# Patient Record
Sex: MALE | Race: WHITE | NOT HISPANIC OR LATINO | Employment: OTHER | URBAN - METROPOLITAN AREA
[De-identification: names, ages, dates, MRNs, and addresses within clinical notes are randomized per-mention and may not be internally consistent; named-entity substitution may affect disease eponyms.]

---

## 2017-10-31 ENCOUNTER — ALLSCRIPTS OFFICE VISIT (OUTPATIENT)
Dept: OTHER | Facility: OTHER | Age: 72
End: 2017-10-31

## 2017-11-01 NOTE — PROGRESS NOTES
Assessment  1  Benign localized prostatic hyperplasia with lower urinary tract symptoms (LUTS)   (600 21,599 69) (N40 1)    Plan  Benign localized prostatic hyperplasia with lower urinary tract symptoms (LUTS)    · Follow-up visit in 1 year Evaluation and Treatment  Follow-up  Status: Hold For -  Scheduling  Requested for: 31Oct2017   Ordered; For: Benign localized prostatic hyperplasia with lower urinary tract symptoms (LUTS); Ordered By: Marisela Pascal Performed:  Due: [id number]   · (1) PSA, DIAGNOSTIC (FOLLOW-UP); Status:Active; Requested for:31Oct2018; Perform:[facility]; RCL:[id number];[id number]; For:Benign localized prostatic hyperplasia with lower urinary tract symptoms (LUTS); Ordered By:Mayer, Lucile Boas;    Discussion/Summary  Discussion Summary:   He denies urinary complaints, prostate exam is normal  Patient will return in one year with PSA prior to next visit  Goals and Barriers: The patient has the current Goals: Patient will return in one year with PSA prior to next visit  The patent has the current Barriers: None  Patient's Capacity to Self-Care: Patient is able to Self-Care  Understands and agrees with treatment plan: The treatment plan was reviewed with the patient/guardian  The patient/guardian understands and agrees with the treatment plan      Chief Complaint  Chief Complaint Free Text Note Form: BPH= 2 8 (10/11/2017)      History of Present Illness  HPI: Since seen in follow up for history of BPH and elevated PSA  PSA trend has gone from 3 6, 3 2, 2 7 to 2 8 currently  He denies any urinary complaints  No history of hematuria urinary tract infections  Review of Systems  Complete-Male Urology:   Constitutional: No fever or chills, feels well, no tiredness, no recent weight gain or weight loss  Respiratory: No complaints of shortness of breath, no wheezing, no cough, no SOB on exertion, no orthopnea or PND     Cardiovascular: No complaints of slow heart rate, no fast heart rate, no chest pain, no palpitations, no leg claudication, no lower extremity  Gastrointestinal: No complaints of abdominal pain, no constipation, no nausea or vomiting, no diarrhea or bloody stools  Genitourinary: Empty sensation-- and-- stream quality fair, but-- as noted in HPI,-- no dysuria,-- no urinary hesitancy,-- no hematuria,-- no incontinence,-- no nocturia-- and-- no feelings of urinary urgency  Musculoskeletal: No complaints of arthralgia, no myalgias, no joint swelling or stiffness, no limb pain or swelling  Integumentary: No complaints of skin rash or skin lesions, no itching, no skin wound, no dry skin  Hematologic/Lymphatic: No complaints of swollen glands, no swollen glands in the neck, does not bleed easily, no easy bruising  Neurological: No compliants of headache, no confusion, no convulsions, no numbness or tingling, no dizziness or fainting, no limb weakness, no difficulty walking  Active Problems  1  Benign localized prostatic hyperplasia with lower urinary tract symptoms (LUTS)   (600 21,599 69) (N40 1)    Past Medical History  Active Problems And Past Medical History Reviewed: The active problems and past medical history were reviewed and updated today  Surgical History  Surgical History Reviewed: The surgical history was reviewed and updated today  Family History  Family History Reviewed: The family history was reviewed and updated today  Social History   · Never A Smoker  Social History Reviewed: The social history was reviewed and updated today  Current Meds   1  Adult Aspirin EC Low Strength 81 MG Oral Tablet Delayed Release Recorded   2  Losartan Potassium 25 MG Oral Tablet; Therapy: [id number] to (Last Rx:[id number])  Requested for: [id number] Ordered  Medication List Reviewed: The medication list was reviewed and updated today  Allergies  1   No Known Drug Allergies    Vitals  Vital Signs    Recorded: [id number] 07:37AM   Heart Rate 60 Systolic 224   Diastolic 80   Height 5 ft 11 in   Weight 186 lb    BMI Calculated 25 94   BSA Calculated 2 04     Physical Exam    Constitutional   General appearance: No acute distress, well appearing and well nourished  Pulmonary   Respiratory effort: No increased work of breathing or signs of respiratory distress  Cardiovascular   Auscultation of heart: Normal rate and rhythm, normal S1 and S2, without murmurs  Examination of extremities for edema and/or varicosities: Normal     Abdomen   Abdomen: Non-tender, no masses  Genitourinary   Anus and perineum: Normal     Scrotum contents: Normal size, no masses  Epididymis: Normal, no masses  Urethral meatus: Normal, no lesions  Penis: Normal, no lesions  Digital rectal exam of prostate: Normal size, no masses  -- 35 gram  No nodules  Musculoskeletal   Gait and station: Normal     Digits and nails: Normal without clubbing or cyanosis  Inspection/palpation of joints, bones, and muscles: Normal     Skin   Skin and subcutaneous tissue: Normal without rashes or lesions      Lymphatic   Palpation of lymph nodes in groin: Normal        Signatures   Electronically signed by : RAIN Eldridge ; Oct 31 2017  7:43AM EST                       (Author)

## 2018-01-14 VITALS
SYSTOLIC BLOOD PRESSURE: 130 MMHG | BODY MASS INDEX: 26.04 KG/M2 | DIASTOLIC BLOOD PRESSURE: 80 MMHG | HEIGHT: 71 IN | WEIGHT: 186 LBS | HEART RATE: 60 BPM

## 2018-06-04 ENCOUNTER — TELEPHONE (OUTPATIENT)
Dept: UROLOGY | Facility: HOSPITAL | Age: 73
End: 2018-06-04

## 2018-06-04 NOTE — TELEPHONE ENCOUNTER
Pt come In and signed medical record release   Records faxed to Dr Hendricks Paget at [phone] per pts request

## 2018-06-08 NOTE — PROGRESS NOTES
6/11/2018    Declan De La Cruz  1945  [id number]    Discussion and Plan    Imaging studies are reviewed which are noteworthy for right 8-9 cm right renal cyst with calcification  No areas of enhancement noted  I discussed that this is considered to be a mildly complex cyst for which periodic surveillance is warranted though no features are present currently suggestive of malignancy  Postvoid residual is nominal at 15 cc  He therefore will follow-up as scheduled later this year for PSA check  Renal bladder ultrasound should be repeated in 1 year  All questions answered at this time  Assessment      Patient Active Problem List   Diagnosis    Renal cyst    Benign prostatic hyperplasia       History of Present Illness    Geronimo Olivas is a 74 y o  male seen today in regards to a history of BPH and elevated PSA  PSA trend has gone from 3 6, 3 2, 2 7 to 2 8 currently  He denies any urinary complaints  No history of hematuria urinary tract infections  He is seen today in regards to an incidental discovery of a approximately 9 cm right renal cyst   This is noted on a CT scan for right lower quadrant discomfort following hip surgery  Patient denies any hematuria or overt flank pain  Review of the images demonstrates a simple right renal cyst with a area of calcification  The walls are otherwise normal with no regions of enhancement seen      Urinary Symptom Assessment        Past Medical History  Past Medical History:   Diagnosis Date    BPH (benign prostatic hyperplasia)        Past Social History  Past Surgical History:   Procedure Laterality Date    APPENDECTOMY      REVISION TOTAL HIP ARTHROPLASTY      TOTAL HIP ARTHROPLASTY         Past Family History  Family History   Problem Relation Age of Onset    No Known Problems Father     Stroke Mother        Past Social history  Social History     Social History    Marital status: /Civil Union     Spouse name: N/A    Number of children: N/A    Years of education: N/A     Occupational History    Not on file  Social History Main Topics    Smoking status: Former Smoker    Smokeless tobacco: Never Used      Comment: pipe - quit inthe 70"s    Alcohol use 0 6 oz/week     1 Glasses of wine per week    Drug use: No    Sexual activity: Not on file     Other Topics Concern    Not on file     Social History Narrative    No narrative on file       Current Medications  Current Outpatient Prescriptions   Medication Sig Dispense Refill    aspirin (ADULT ASPIRIN EC LOW STRENGTH) 81 mg EC tablet Take by mouth      losartan (COZAAR) 25 mg tablet TAKE 1 TABLET BY MOUTH EVERY DAY      multivitamin (THERAGRAN) TABS Take 1 tablet by mouth      sildenafil (VIAGRA) 100 mg tablet Viagra 100 mg tablet       No current facility-administered medications for this visit  Allergies  Allergies   Allergen Reactions    Celecoxib Dizziness       Past Medical History, Social History, Family History, medications and allergies were reviewed  Review of Systems  Review of Systems   Constitutional: Negative  HENT: Negative  Eyes: Negative  Respiratory: Negative  Cardiovascular: Negative  Gastrointestinal: Negative  Endocrine: Negative  Genitourinary: Negative for decreased urine volume, difficulty urinating and hematuria  Musculoskeletal: Negative  Skin: Negative  Neurological: Negative  Hematological: Negative  Psychiatric/Behavioral: Negative  Vitals  Vitals:    06/11/18 1352   BP: 142/86   Pulse: 90   Weight: 84 7 kg (186 lb 12 8 oz)   Height: 5' 11" (1 803 m)         Physical Exam    Physical Exam   Constitutional: He is oriented to person, place, and time  He appears well-developed and well-nourished  HENT:   Head: Normocephalic and atraumatic  Eyes: Pupils are equal, round, and reactive to light  Neck: Normal range of motion  Cardiovascular: Normal rate, regular rhythm and normal heart sounds      Pulmonary/Chest: Effort normal and breath sounds normal  No accessory muscle usage  No respiratory distress  Abdominal: Soft  Normal appearance and bowel sounds are normal  There is no tenderness  Musculoskeletal: Normal range of motion  Neurological: He is alert and oriented to person, place, and time  Skin: Skin is warm, dry and intact  Psychiatric: He has a normal mood and affect  His speech is normal  Cognition and memory are normal    Nursing note and vitals reviewed  Results    Below listed labs, pathology results, and radiology images were personally reviewed:    No results found for: PSA  No results found for: GLUCOSE, CALCIUM, NA, K, CO2, CL, BUN, CREATININE  No results found for: WBC, HGB, HCT, MCV, PLT    Recent Results (from the past 1 hour(s))   POCT Measure PVR    Collection Time: 06/11/18  1:59 PM   Result Value Ref Range    POST-VOID RESIDUAL VOLUME, ML POC 15 mL   ]      Result Impression   Impression:  1  No acute abnormality within the abdomen or pelvis  2  Mildly complex right upper pole renal cyst as described  3  Colonic diverticulosis without acute diverticulitis  4  Other findings as above  Workstation:[email or web address]   Result Narrative   Clinical History: Right lower quadrant abdominal pain  Comparison: No previous abdominal CT  Comment: CT of the abdomen and pelvis was performed after the administration of  90 cc of Omnipaque 350 intravenous contrast  450 cc of barium sulfate oral  contrast was administered prior to the study as well  Abdomen:  Lower chest: Minimal dependent atelectatic changes at both lung bases  Liver: Unremarkable  Spleen: Unremarkable  Pancreas: Unremarkable  Adrenal glands: Unremarkable  Gallbladder/bile ducts: Unremarkable  Kidneys: Mildly complex exophytic right upper pole renal cyst with minimal  peripheral wall calcification posteriorly, measures up to 8 9 cm in diameter  No  hydronephrosis  Bowel: Small and large bowel normal in caliber  Colonic diverticulosis, without  acute diverticulitis  Status post appendectomy  Lymph nodes: No retroperitoneal or mesenteric adenopathy  Peritoneum: No free fluid, free intraperitoneal air, or fluid collection within  the abdomen  Vessels: Abdominal aorta normal in caliber with mild atherosclerotic changes  Pelvis:  Streak artifact from patient's bilateral hip prosthesis limits evaluation of  portions of the lower pelvis  Urinary bladder: Well-visualized given the above described limitation  Lymph nodes: No pelvic adenopathy given streak artifact  Bones: Status post bilateral total hip arthroplasty

## 2018-06-11 ENCOUNTER — OFFICE VISIT (OUTPATIENT)
Dept: UROLOGY | Facility: AMBULATORY SURGERY CENTER | Age: 73
End: 2018-06-11
Payer: COMMERCIAL

## 2018-06-11 VITALS
SYSTOLIC BLOOD PRESSURE: 142 MMHG | HEART RATE: 90 BPM | HEIGHT: 71 IN | BODY MASS INDEX: 26.15 KG/M2 | WEIGHT: 186.8 LBS | DIASTOLIC BLOOD PRESSURE: 86 MMHG

## 2018-06-11 DIAGNOSIS — N28.1 RENAL CYST: ICD-10-CM

## 2018-06-11 DIAGNOSIS — N40.0 BENIGN PROSTATIC HYPERPLASIA, UNSPECIFIED WHETHER LOWER URINARY TRACT SYMPTOMS PRESENT: Primary | ICD-10-CM

## 2018-06-11 LAB — POST-VOID RESIDUAL VOLUME, ML POC: 15 ML

## 2018-06-11 PROCEDURE — 99214 OFFICE O/P EST MOD 30 MIN: CPT | Performed by: UROLOGY

## 2018-06-11 PROCEDURE — 51798 US URINE CAPACITY MEASURE: CPT | Performed by: UROLOGY

## 2018-06-11 RX ORDER — LOSARTAN POTASSIUM 50 MG/1
TABLET ORAL
COMMUNITY
Start: 2018-01-29 | End: 2020-07-22 | Stop reason: ALTCHOICE

## 2018-06-11 RX ORDER — ASPIRIN 81 MG/1
TABLET ORAL
COMMUNITY

## 2018-06-11 RX ORDER — DIPHENOXYLATE HYDROCHLORIDE AND ATROPINE SULFATE 2.5; .025 MG/1; MG/1
1 TABLET ORAL
COMMUNITY

## 2018-06-11 RX ORDER — SILDENAFIL 100 MG/1
TABLET, FILM COATED ORAL
COMMUNITY

## 2018-10-31 DIAGNOSIS — N40.1 ENLARGED PROSTATE WITH LOWER URINARY TRACT SYMPTOMS (LUTS): ICD-10-CM

## 2018-10-31 NOTE — PROGRESS NOTES
11/1/2018    Ami Mendota  1945  [id number]    Discussion and Plan    Patient continues to do well with no significant changes in urinary pattern  PSA trend stable  He will return in 1 year with PSA and repeat renal ultrasound prior to visit  All questions answered at this time  1  Benign prostatic hyperplasia with lower urinary tract symptoms, symptom details unspecified  - PSA Total, Diagnostic; Future    2  Renal cyst  - US kidney and bladder with pvr; Future    Assessment      Patient Active Problem List   Diagnosis    Renal cyst    Benign prostatic hyperplasia       History of Present Illness    Juanita Cabral is a 74 y o  male seen today in regards to a history of BPH and elevated PSA  PSA trend has gone from 3 6, 3 2, 2 7 to 2 8 and 2 7 currently  He denies any urinary complaints  No history of hematuria urinary tract infections  He is seen today in regards to an incidental discovery of a approximately 9 cm right renal cyst   This is noted on a CT scan for right lower quadrant discomfort following hip surgery  Patient denies any hematuria or overt flank pain  Review of the images demonstrates a simple right renal cyst with a area of calcification  The walls are otherwise normal with no regions of enhancement seen  Urinary Symptom Assessment        Past Medical History  Past Medical History:   Diagnosis Date    BPH (benign prostatic hyperplasia)     Hypertension        Past Social History  Past Surgical History:   Procedure Laterality Date    APPENDECTOMY      REVISION TOTAL HIP ARTHROPLASTY      TOTAL HIP ARTHROPLASTY         Past Family History  Family History   Problem Relation Age of Onset    No Known Problems Father     Stroke Mother        Past Social history  Social History     Social History    Marital status: /Civil Union     Spouse name: N/A    Number of children: N/A    Years of education: N/A     Occupational History    Not on file       Social History Main Topics  Smoking status: Former Smoker    Smokeless tobacco: Never Used      Comment: pipe - quit inthe 70"s    Alcohol use 0 6 oz/week     1 Glasses of wine per week    Drug use: No    Sexual activity: Not on file     Other Topics Concern    Not on file     Social History Narrative    No narrative on file       Current Medications  Current Outpatient Prescriptions   Medication Sig Dispense Refill    aspirin (ADULT ASPIRIN EC LOW STRENGTH) 81 mg EC tablet Take by mouth      losartan (COZAAR) 50 mg tablet TAKE 1 TABLET BY MOUTH EVERY DAY      multivitamin (THERAGRAN) TABS Take 1 tablet by mouth      sildenafil (VIAGRA) 100 mg tablet Viagra 100 mg tablet       No current facility-administered medications for this visit  Allergies  Allergies   Allergen Reactions    Celecoxib Dizziness       Past Medical History, Social History, Family History, medications and allergies were reviewed  Review of Systems  Review of Systems   Genitourinary: Negative for decreased urine volume, difficulty urinating and hematuria  All other systems reviewed and are negative  Vitals  Vitals:    11/01/18 1027   BP: 142/82   BP Location: Left arm   Patient Position: Sitting   Cuff Size: Standard   Pulse: 80   Weight: 81 6 kg (180 lb)   Height: 5' 10 5" (1 791 m)         Physical Exam    Physical Exam   Constitutional: He is oriented to person, place, and time  He appears well-developed and well-nourished  HENT:   Head: Normocephalic and atraumatic  Eyes: Pupils are equal, round, and reactive to light  Neck: Normal range of motion  Cardiovascular: Normal rate, regular rhythm and normal heart sounds  Pulmonary/Chest: Effort normal and breath sounds normal  No accessory muscle usage  No respiratory distress  Abdominal: Soft  Normal appearance and bowel sounds are normal  There is no tenderness  Genitourinary: Rectum normal, prostate normal and penis normal  No penile tenderness     Genitourinary Comments: Prostate approximately 40 g  No nodules  Musculoskeletal: Normal range of motion  Neurological: He is alert and oriented to person, place, and time  Skin: Skin is warm, dry and intact  Psychiatric: He has a normal mood and affect  His speech is normal  Cognition and memory are normal    Nursing note and vitals reviewed        Results    Below listed labs, pathology results, and radiology images were personally reviewed:    No results found for: PSA  No results found for: GLUCOSE, CALCIUM, NA, K, CO2, CL, BUN, CREATININE  No results found for: WBC, HGB, HCT, MCV, PLT    No results found for this or any previous visit (from the past 1 hour(s)) ]

## 2018-11-01 ENCOUNTER — OFFICE VISIT (OUTPATIENT)
Dept: UROLOGY | Facility: AMBULATORY SURGERY CENTER | Age: 73
End: 2018-11-01
Payer: COMMERCIAL

## 2018-11-01 VITALS
WEIGHT: 180 LBS | HEART RATE: 80 BPM | BODY MASS INDEX: 25.2 KG/M2 | DIASTOLIC BLOOD PRESSURE: 82 MMHG | HEIGHT: 71 IN | SYSTOLIC BLOOD PRESSURE: 142 MMHG

## 2018-11-01 DIAGNOSIS — N40.1 BENIGN PROSTATIC HYPERPLASIA WITH LOWER URINARY TRACT SYMPTOMS, SYMPTOM DETAILS UNSPECIFIED: Primary | ICD-10-CM

## 2018-11-01 DIAGNOSIS — N28.1 RENAL CYST: ICD-10-CM

## 2018-11-01 PROCEDURE — 99214 OFFICE O/P EST MOD 30 MIN: CPT | Performed by: UROLOGY

## 2019-10-16 ENCOUNTER — TELEPHONE (OUTPATIENT)
Dept: UROLOGY | Facility: AMBULATORY SURGERY CENTER | Age: 74
End: 2019-10-16

## 2019-10-16 NOTE — TELEPHONE ENCOUNTER
patient stated that he was on his way to the doctor , and that he will call us back later to reschedule the appt due to him not knowing when he can come in     NL

## 2019-11-01 ENCOUNTER — HOSPITAL ENCOUNTER (OUTPATIENT)
Dept: RADIOLOGY | Age: 74
Discharge: HOME | End: 2019-11-01
Payer: MEDICARE

## 2019-11-01 DIAGNOSIS — N28.1 RENAL CYST: ICD-10-CM

## 2019-11-01 PROCEDURE — 51798 US URINE CAPACITY MEASURE: CPT

## 2019-11-11 ENCOUNTER — TELEPHONE (OUTPATIENT)
Dept: UROLOGY | Facility: AMBULATORY SURGERY CENTER | Age: 74
End: 2019-11-11

## 2019-11-11 NOTE — TELEPHONE ENCOUNTER
Patient has upcoming appointment for Gauri Pineda on 12/02/19 and stated he needs an updated psa lab order mailed to his home address    He goes to [address] [place] [address]

## 2019-11-20 NOTE — TELEPHONE ENCOUNTER
Call placed to patient, apologized for the delay  Offered to email to patient   Order emailed to him at [email or web address]

## 2019-11-20 NOTE — TELEPHONE ENCOUNTER
Patient left message on the medication refill line saying that he still has not received his script for the PSA  Patient is worried he wont have it in time for his appointment

## 2020-01-06 ENCOUNTER — OFFICE VISIT (OUTPATIENT)
Dept: UROLOGY | Facility: AMBULATORY SURGERY CENTER | Age: 75
End: 2020-01-06
Payer: MEDICARE

## 2020-01-06 VITALS
SYSTOLIC BLOOD PRESSURE: 120 MMHG | WEIGHT: 183 LBS | HEIGHT: 70 IN | HEART RATE: 80 BPM | BODY MASS INDEX: 26.2 KG/M2 | DIASTOLIC BLOOD PRESSURE: 72 MMHG

## 2020-01-06 DIAGNOSIS — R97.20 ELEVATED PSA: ICD-10-CM

## 2020-01-06 DIAGNOSIS — N28.1 RENAL CYST: Primary | ICD-10-CM

## 2020-01-06 PROCEDURE — 99214 OFFICE O/P EST MOD 30 MIN: CPT | Performed by: UROLOGY

## 2020-01-06 NOTE — PROGRESS NOTES
1/6/2020    Alfredo Frank  1945  [id number]        Assessment  Large right renal cyst  Elevated PSA    Plan  We reviewed his recent ultrasound indicating a stable 9 cm mildly complex right renal cyst   We discussed his concerned about this  Will check another ultrasound in 1 year to ensure stability, and then likely will discontinue imaging  Regarding elevated PSA, we discussed AUA guidelines and usual discontinuation of PSA screening  We also discussed potential reasons for significant PSA rise  I would repeat his PSA and check fractionated PSA as well  He will abstain from sexual activity for 72 hours prior  History of Present Illness  Zan Vergara is a 66 y o  male former patient of Dr Vijay Rosales with history of BPH and large right renal cyst   He has had PSA stable within the 2 to 3 range, but this past year had PSA 4 95  He is unsure if he was sexually active around that time  He denies any urinary complaints despite enlarged prostate  AUA symptom score is 6  Review of Systems  Review of Systems   Constitutional: Negative  HENT: Negative  Respiratory: Negative  Cardiovascular: Negative  Gastrointestinal: Negative  Genitourinary:        As per HPI   Musculoskeletal: Negative  Skin: Negative  Neurological: Negative  Hematological: Negative            Past Medical History  Past Medical History:   Diagnosis Date    BPH (benign prostatic hyperplasia)     Hypertension        Past Social History  Past Surgical History:   Procedure Laterality Date    APPENDECTOMY      REVISION TOTAL HIP ARTHROPLASTY      TOTAL HIP ARTHROPLASTY         Past Family History  Family History   Problem Relation Age of Onset    No Known Problems Father     Stroke Mother        Past Social history  Social History     Socioeconomic History    Marital status: /Civil Union     Spouse name: Not on file    Number of children: Not on file    Years of education: Not on file   Rona Jones Highest education level: Not on file   Occupational History    Not on file   Social Needs    Financial resource strain: Not on file    Food insecurity:     Worry: Not on file     Inability: Not on file    Transportation needs:     Medical: Not on file     Non-medical: Not on file   Tobacco Use    Smoking status: Former Smoker    Smokeless tobacco: Never Used    Tobacco comment: pipe - quit inthe 70"s   Substance and Sexual Activity    Alcohol use: Yes     Alcohol/week: 1 0 standard drinks     Types: 1 Glasses of wine per week    Drug use: No    Sexual activity: Not on file   Lifestyle    Physical activity:     Days per week: Not on file     Minutes per session: Not on file    Stress: Not on file   Relationships    Social connections:     Talks on phone: Not on file     Gets together: Not on file     Attends Spiritism service: Not on file     Active member of club or organization: Not on file     Attends meetings of clubs or organizations: Not on file     Relationship status: Not on file    Intimate partner violence:     Fear of current or ex partner: Not on file     Emotionally abused: Not on file     Physically abused: Not on file     Forced sexual activity: Not on file   Other Topics Concern    Not on file   Social History Narrative    Not on file     Social History     Tobacco Use   Smoking Status Former Smoker   Smokeless Tobacco Never Used   Tobacco Comment    pipe - quit inthe 70"s       Current Medications  Current Outpatient Medications   Medication Sig Dispense Refill    aspirin (ADULT ASPIRIN EC LOW STRENGTH) 81 mg EC tablet Take by mouth      losartan (COZAAR) 50 mg tablet TAKE 1 TABLET BY MOUTH EVERY DAY      multivitamin (THERAGRAN) TABS Take 1 tablet by mouth      sildenafil (VIAGRA) 100 mg tablet Viagra 100 mg tablet       No current facility-administered medications for this visit          Allergies  Allergies   Allergen Reactions    Celecoxib Dizziness       Past Medical History, Social History, Family History, medications and allergies were reviewed  Vitals  Vitals:    01/06/20 1502   BP: 120/72   BP Location: Left arm   Patient Position: Sitting   Cuff Size: Adult   Pulse: 80   Weight: 83 kg (183 lb)   Height: 5' 10" (1 778 m)       Physical Exam  Physical Exam   Constitutional: He is oriented to person, place, and time  He appears well-developed and well-nourished  Cardiovascular: Normal rate  Pulmonary/Chest: Effort normal    Abdominal: Soft  Genitourinary:   Genitourinary Comments: Prostate enlarged, about 60 g, smooth, no palpable nodule  Musculoskeletal: Normal range of motion  Neurological: He is alert and oriented to person, place, and time  Skin: Skin is warm, dry and intact  Psychiatric: He has a normal mood and affect  Vitals reviewed          Results  No results found for: PSA  No results found for: GLUCOSE, CALCIUM, NA, K, CO2, CL, BUN, CREATININE  No results found for: WBC, HGB, HCT, MCV, PLT

## 2020-01-15 ENCOUNTER — TELEPHONE (OUTPATIENT)
Dept: UROLOGY | Facility: MEDICAL CENTER | Age: 75
End: 2020-01-15

## 2020-01-15 DIAGNOSIS — R97.20 ELEVATED PSA: Primary | ICD-10-CM

## 2020-01-15 NOTE — TELEPHONE ENCOUNTER
Call placed to patient, advised that I spoke with Dr Kenya Garcia regarding his PSA results  He recommends patient follow up in 6 months with repeat PSA ptv  Patient verbalized understanding and agrees with plan  Patient requests order be mailed to his home as he uses [facility]   This was done per his request

## 2020-01-15 NOTE — TELEPHONE ENCOUNTER
Patient of Dr Ashley Mccartney seen in [facility] in regards to scheduling follow up to discuss PSA Results  Dr Melanee Kehr notes state around 2/6, but I do not have access to an appropriate appointment      Kindly assist     He can be reached at [phone]

## 2020-07-22 ENCOUNTER — OFFICE VISIT (OUTPATIENT)
Dept: UROLOGY | Facility: AMBULATORY SURGERY CENTER | Age: 75
End: 2020-07-22
Payer: MEDICARE

## 2020-07-22 VITALS
BODY MASS INDEX: 26.63 KG/M2 | HEIGHT: 70 IN | WEIGHT: 186 LBS | TEMPERATURE: 98.2 F | SYSTOLIC BLOOD PRESSURE: 118 MMHG | DIASTOLIC BLOOD PRESSURE: 86 MMHG | HEART RATE: 123 BPM

## 2020-07-22 DIAGNOSIS — N40.1 BENIGN PROSTATIC HYPERPLASIA WITH LOWER URINARY TRACT SYMPTOMS, SYMPTOM DETAILS UNSPECIFIED: ICD-10-CM

## 2020-07-22 DIAGNOSIS — R97.20 ELEVATED PSA: Primary | ICD-10-CM

## 2020-07-22 LAB
POST-VOID RESIDUAL VOLUME, ML POC: 16 ML
SL AMB  POCT GLUCOSE, UA: ABNORMAL
SL AMB LEUKOCYTE ESTERASE,UA: ABNORMAL
SL AMB POCT BILIRUBIN,UA: ABNORMAL
SL AMB POCT BLOOD,UA: 2
SL AMB POCT CLARITY,UA: CLEAR
SL AMB POCT COLOR,UA: ABNORMAL
SL AMB POCT KETONES,UA: ABNORMAL
SL AMB POCT NITRITE,UA: ABNORMAL
SL AMB POCT PH,UA: 5
SL AMB POCT SPECIFIC GRAVITY,UA: 1.03
SL AMB POCT URINE PROTEIN: ABNORMAL
SL AMB POCT UROBILINOGEN: ABNORMAL

## 2020-07-22 PROCEDURE — 99213 OFFICE O/P EST LOW 20 MIN: CPT | Performed by: UROLOGY

## 2020-07-22 PROCEDURE — 51798 US URINE CAPACITY MEASURE: CPT | Performed by: UROLOGY

## 2020-07-22 PROCEDURE — 81002 URINALYSIS NONAUTO W/O SCOPE: CPT | Performed by: UROLOGY

## 2020-07-22 RX ORDER — CANDESARTAN 16 MG/1
8 TABLET ORAL DAILY
COMMUNITY
Start: 2020-05-12 | End: 2021-12-13

## 2020-07-22 NOTE — PROGRESS NOTES
7/22/2020    Allan Tillman  1945  [id number]        Assessment  Elevated PSA    Plan  We discussed his findings  He understands that PSA fluctuation can be normal   He is not very concerned as he understands that PSA between 4 and 5 is normal and his age  He would like to continue observation however and has agreed to check a PSA in 6 months  At that time also check prostate examination  He is also scheduled for ultrasound of the kidneys to re-evaluate his renal cyst at that time as well  All question answered and he agrees with the plan  History of Present Illness  Tim Mendez is a 66 y o  male history of large renal cyst, BPH, and elevated PSA  He denies any urinary symptoms at this time  He feels that he does well and empties his bladder  Bladder scan postvoid residual 16 mL in office today  PSA elevated to 4 74  Previously had a PSA of 4 95 which subsequently went down to 4 0  Review of Systems  Review of Systems   Constitutional: Negative  HENT: Negative  Respiratory: Negative  Cardiovascular: Negative  Gastrointestinal: Negative  Genitourinary:        As per HPI   Musculoskeletal: Negative  Skin: Negative  Neurological: Negative  Hematological: Negative            Past Medical History  Past Medical History:   Diagnosis Date    BPH (benign prostatic hyperplasia)     Hypertension        Past Social History  Past Surgical History:   Procedure Laterality Date    APPENDECTOMY      REVISION TOTAL HIP ARTHROPLASTY      TOTAL HIP ARTHROPLASTY         Past Family History  Family History   Problem Relation Age of Onset    No Known Problems Father     Stroke Mother        Past Social history  Social History     Socioeconomic History    Marital status: /Civil Union     Spouse name: Not on file    Number of children: Not on file    Years of education: Not on file    Highest education level: Not on file   Occupational History    Not on file Social Needs    Financial resource strain: Not on file    Food insecurity:     Worry: Not on file     Inability: Not on file    Transportation needs:     Medical: Not on file     Non-medical: Not on file   Tobacco Use    Smoking status: Former Smoker    Smokeless tobacco: Never Used    Tobacco comment: pipe - quit inthe 70"s   Substance and Sexual Activity    Alcohol use: Yes     Alcohol/week: 1 0 standard drinks     Types: 1 Glasses of wine per week    Drug use: No    Sexual activity: Not on file   Lifestyle    Physical activity:     Days per week: Not on file     Minutes per session: Not on file    Stress: Not on file   Relationships    Social connections:     Talks on phone: Not on file     Gets together: Not on file     Attends Mormon service: Not on file     Active member of club or organization: Not on file     Attends meetings of clubs or organizations: Not on file     Relationship status: Not on file    Intimate partner violence:     Fear of current or ex partner: Not on file     Emotionally abused: Not on file     Physically abused: Not on file     Forced sexual activity: Not on file   Other Topics Concern    Not on file   Social History Narrative    Not on file     Social History     Tobacco Use   Smoking Status Former Smoker   Smokeless Tobacco Never Used   Tobacco Comment    pipe - quit inthe 70"s       Current Medications  Current Outpatient Medications   Medication Sig Dispense Refill    aspirin (ADULT ASPIRIN EC LOW STRENGTH) 81 mg EC tablet Take by mouth      candesartan (ATACAND) 16 mg tablet Take 16 mg by mouth daily      multivitamin (THERAGRAN) TABS Take 1 tablet by mouth      sildenafil (VIAGRA) 100 mg tablet Viagra 100 mg tablet       No current facility-administered medications for this visit          Allergies  Allergies   Allergen Reactions    Celecoxib Dizziness       Past Medical History, Social History, Family History, medications and allergies were reviewed  Vitals  Vitals:    07/22/20 1334   BP: 118/86   BP Location: Left arm   Patient Position: Sitting   Cuff Size: Adult   Pulse: (!) 123   Temp: 98 2 °F (36 8 °C)   TempSrc: Temporal   Weight: 84 4 kg (186 lb)   Height: 5' 10" (1 778 m)       Physical Exam  Physical Exam   Constitutional: He is oriented to person, place, and time  He appears well-developed and well-nourished  HENT:   Head: Normocephalic and atraumatic  Eyes: Conjunctivae are normal    Cardiovascular: Normal rate  Pulmonary/Chest: Effort normal    Abdominal: Soft  Musculoskeletal: Normal range of motion  Neurological: He is alert and oriented to person, place, and time  Skin: Skin is warm, dry and intact  Psychiatric: He has a normal mood and affect  Vitals reviewed          Results  No results found for: PSA  No results found for: GLUCOSE, CALCIUM, NA, K, CO2, CL, BUN, CREATININE  No results found for: WBC, HGB, HCT, MCV, PLT

## 2021-01-04 ENCOUNTER — TRANSCRIBE ORDERS (OUTPATIENT)
Dept: ADMINISTRATIVE | Facility: HOSPITAL | Age: 76
End: 2021-01-04

## 2021-01-04 DIAGNOSIS — N28.1 CYST OF KIDNEY, ACQUIRED: Primary | ICD-10-CM

## 2021-03-15 ENCOUNTER — HOSPITAL ENCOUNTER (OUTPATIENT)
Dept: RADIOLOGY | Age: 76
Discharge: HOME | End: 2021-03-15
Payer: MEDICARE

## 2021-03-15 DIAGNOSIS — N28.1 CYST OF KIDNEY, ACQUIRED: ICD-10-CM

## 2021-03-15 PROCEDURE — 76770 US EXAM ABDO BACK WALL COMP: CPT

## 2021-03-26 ENCOUNTER — OFFICE VISIT (OUTPATIENT)
Dept: UROLOGY | Facility: AMBULATORY SURGERY CENTER | Age: 76
End: 2021-03-26
Payer: MEDICARE

## 2021-03-26 ENCOUNTER — TELEPHONE (OUTPATIENT)
Dept: UROLOGY | Facility: AMBULATORY SURGERY CENTER | Age: 76
End: 2021-03-26

## 2021-03-26 VITALS
SYSTOLIC BLOOD PRESSURE: 120 MMHG | WEIGHT: 190 LBS | HEIGHT: 70 IN | HEART RATE: 80 BPM | DIASTOLIC BLOOD PRESSURE: 60 MMHG | BODY MASS INDEX: 27.2 KG/M2

## 2021-03-26 DIAGNOSIS — R97.20 ELEVATED PSA: Primary | ICD-10-CM

## 2021-03-26 PROCEDURE — 99213 OFFICE O/P EST LOW 20 MIN: CPT | Performed by: UROLOGY

## 2021-03-26 NOTE — PROGRESS NOTES
3/26/2021    Pia Jasso  1945  [id number]        Assessment    Elevated PSA    Plan    We discussed his findings  PSA remains concerning and has risen over 5 now  We discussed risks and benefits of prostate biopsy  I think at this point it is reasonable to proceed with transrectal ultrasound-guided prostate biopsy  After discussion, he agrees  All questions answered to his satisfaction  Total visit time was 15 minutes of which over 50% was spent on counseling  History of Present Illness  Kimo Mc is a 66 y o  male  With persistently elevated PSA  In 2018 his PSA was normal at 2 7  Since then PSA has been in the 4 to 5 range  Most recent PSA 5 27 at [facility]  He denies any urinary symptoms  Feels that he empties bladder well all the time, although he may be developing slightly increasing urinary symptoms with age  Of note he also has a large renal cyst which is asymptomatic  AUA SYMPTOM SCORE      Most Recent Value   AUA SYMPTOM SCORE   How often have you had a sensation of not emptying your bladder completely after you finished urinating? 1   How often have you had to urinate again less than two hours after you finished urinating? 3   How often have you found you stopped and started again several times when you urinate?  0   How often have you found it difficult to postpone urination? 1   How often have you had a weak urinary stream?  1   How often have you had to push or strain to begin urination? 0   How many times did you most typically get up to urinate from the time you went to bed at night until the time you got up in the morning? 1   Quality of Life: If you were to spend the rest of your life with your urinary condition just the way it is now, how would you feel about that?  2   AUA SYMPTOM SCORE  7          Review of Systems  Review of Systems   Constitutional: Negative  HENT: Negative  Respiratory: Negative  Cardiovascular: Negative  Gastrointestinal: Negative  Genitourinary:        As per HPI   Musculoskeletal: Negative  Skin: Negative  Neurological: Negative  Hematological: Negative  Past Medical History  Past Medical History:   Diagnosis Date    BPH (benign prostatic hyperplasia)     Hypertension        Past Social History  Past Surgical History:   Procedure Laterality Date    APPENDECTOMY      REVISION TOTAL HIP ARTHROPLASTY      TOTAL HIP ARTHROPLASTY         Past Family History  Family History   Problem Relation Age of Onset    No Known Problems Father     Stroke Mother        Past Social history  Social History     Socioeconomic History    Marital status: /Civil Union     Spouse name: Not on file    Number of children: Not on file    Years of education: Not on file    Highest education level: Not on file   Occupational History    Not on file   Social Needs    Financial resource strain: Not on file    Food insecurity     Worry: Not on file     Inability: Not on file    Transportation needs     Medical: Not on file     Non-medical: Not on file   Tobacco Use    Smoking status: Former Smoker    Smokeless tobacco: Never Used    Tobacco comment: pipe - quit inthe 70"s   Substance and Sexual Activity    Alcohol use:  Yes     Alcohol/week: 1 0 standard drinks     Types: 1 Glasses of wine per week    Drug use: No    Sexual activity: Not on file   Lifestyle    Physical activity     Days per week: Not on file     Minutes per session: Not on file    Stress: Not on file   Relationships    Social connections     Talks on phone: Not on file     Gets together: Not on file     Attends Buddhist service: Not on file     Active member of club or organization: Not on file     Attends meetings of clubs or organizations: Not on file     Relationship status: Not on file    Intimate partner violence     Fear of current or ex partner: Not on file     Emotionally abused: Not on file     Physically abused: Not on file     Forced sexual activity: Not on file   Other Topics Concern    Not on file   Social History Narrative    Not on file     Social History     Tobacco Use   Smoking Status Former Smoker   Smokeless Tobacco Never Used   Tobacco Comment    pipe - quit inthe 70"s       Current Medications  Current Outpatient Medications   Medication Sig Dispense Refill    aspirin (ADULT ASPIRIN EC LOW STRENGTH) 81 mg EC tablet Take by mouth      candesartan (ATACAND) 16 mg tablet Take 16 mg by mouth daily      multivitamin (THERAGRAN) TABS Take 1 tablet by mouth      sildenafil (VIAGRA) 100 mg tablet Viagra 100 mg tablet       No current facility-administered medications for this visit  Allergies  Allergies   Allergen Reactions    Celecoxib Dizziness       Past Medical History, Social History, Family History, medications and allergies were reviewed      Vitals  Vitals:    03/26/21 1424   BP: 120/60   Pulse: 80   Weight: 86 2 kg (190 lb)   Height: 5' 10" (1 778 m)       Physical Exam  Physical Exam      Results  No results found for: PSA  No results found for: GLUCOSE, CALCIUM, NA, K, CO2, CL, BUN, CREATININE  No results found for: WBC, HGB, HCT, MCV, PLT

## 2021-04-05 ENCOUNTER — PROCEDURE VISIT (OUTPATIENT)
Dept: UROLOGY | Facility: AMBULATORY SURGERY CENTER | Age: 76
End: 2021-04-05
Payer: MEDICARE

## 2021-04-05 VITALS
SYSTOLIC BLOOD PRESSURE: 140 MMHG | DIASTOLIC BLOOD PRESSURE: 88 MMHG | HEIGHT: 70 IN | BODY MASS INDEX: 27.2 KG/M2 | HEART RATE: 91 BPM | WEIGHT: 190 LBS

## 2021-04-05 DIAGNOSIS — R97.20 ELEVATED PSA: Primary | ICD-10-CM

## 2021-04-05 PROCEDURE — 76942 ECHO GUIDE FOR BIOPSY: CPT | Performed by: UROLOGY

## 2021-04-05 PROCEDURE — 96372 THER/PROPH/DIAG INJ SC/IM: CPT

## 2021-04-05 PROCEDURE — G0416 PROSTATE BIOPSY, ANY MTHD: HCPCS | Performed by: PATHOLOGY

## 2021-04-05 PROCEDURE — 55700 PR BIOPSY OF PROSTATE,NEEDLE/PUNCH: CPT | Performed by: UROLOGY

## 2021-04-05 RX ORDER — CEFTRIAXONE 1 G/1
1000 INJECTION, POWDER, FOR SOLUTION INTRAMUSCULAR; INTRAVENOUS ONCE
Status: COMPLETED | OUTPATIENT
Start: 2021-04-05 | End: 2021-04-05

## 2021-04-05 RX ADMIN — CEFTRIAXONE 1000 MG: 1 INJECTION, POWDER, FOR SOLUTION INTRAMUSCULAR; INTRAVENOUS at 10:39

## 2021-04-05 NOTE — PROGRESS NOTES
Prostate Biopsy note: The patient returns to the office today to undergo a transrectal ultrasound-guided biopsy of the prostate secondary to an elevated PSA  Risk and benefits of the procedure were discussed  Informed consent was obtained  The patient's prebiopsy preparation was deemed to be adequate  Antibiotics had been taken as prescribed  If appropriate blood thinners had been placed on hold  The patient completed an enema as prescribed  The patient was placed in the lateral decubitus position  Digital rectal examination was performed revealing a 40 gram prostate  Viscous lidocaine jelly was instilled into the rectum  Transrectal ultrasonography was then performed  The prostate measured 35 grams  No obvious mass visible on ultrasound  5 cc of 2% lidocaine were then injected bilaterally between the junction of the base of the prostate and the seminal vesicles  Ultrasound guidance was utilized to place the needle into proper position for the administration of the local anesthetic  A standard 12 core biopsy was then performed  Overall the patient tolerated the procedure and there were no complications

## 2021-04-07 NOTE — TELEPHONE ENCOUNTER
Called number provided to advise pt Dr that the appropriate actions were in place, phone number was busy

## 2021-04-07 NOTE — TELEPHONE ENCOUNTER
Biopsy was on 4/5/21  Pt already has a two week follow up with Dr Bradshaw to discuss results which is the typically time frame

## 2021-04-07 NOTE — TELEPHONE ENCOUNTER
Dr Sobeida Marie called in to report positive biopsy - left base 3 plus 3    Dr Sobeida Marie can be reached at [phone]

## 2021-04-08 ENCOUNTER — TELEPHONE (OUTPATIENT)
Dept: UROLOGY | Facility: AMBULATORY SURGERY CENTER | Age: 76
End: 2021-04-08

## 2021-04-21 ENCOUNTER — OFFICE VISIT (OUTPATIENT)
Dept: UROLOGY | Facility: AMBULATORY SURGERY CENTER | Age: 76
End: 2021-04-21
Payer: MEDICARE

## 2021-04-21 VITALS
DIASTOLIC BLOOD PRESSURE: 72 MMHG | HEART RATE: 78 BPM | SYSTOLIC BLOOD PRESSURE: 130 MMHG | WEIGHT: 195 LBS | BODY MASS INDEX: 27.92 KG/M2 | HEIGHT: 70 IN

## 2021-04-21 DIAGNOSIS — C61 PROSTATE CANCER (HCC): Primary | ICD-10-CM

## 2021-04-21 PROCEDURE — 99214 OFFICE O/P EST MOD 30 MIN: CPT | Performed by: UROLOGY

## 2021-04-21 NOTE — PATIENT INSTRUCTIONS
Very low risk prostate cancer, small volume  Active surveillance recommended: frequent PSA, MRI, repeat biopsy at 1 year

## 2021-04-22 NOTE — PROGRESS NOTES
4/21/2021    Allan Tillman  1945  [id number]        Assessment  Sahra 6 prostate cancer    Plan  We discussed implications of his findings  We discussed the grading and staging of prostate cancer in general   He understands that this is considered very low risk prostate cancer and it is unlikely to cause him any major morbidity or mortality  We discussed that the recommendation for this finding is active surveillance  We discussed the active surveillance protocol in detail  He understands we will proceed with PSA and evaluation every 3 to 4 months as well as MRI and confirmatory prostate biopsy at approximately 1 year  If he is found to have significant rise in his PSA over that time span, we may accelerate his evaluation with MRI and biopsy  We certainly do not expect any morbidity or mortality related to this  All questions answered to his satisfaction and he is happy with this plan  History of Present Illness  Tim Mendez is a 66 y o  male with history of elevated PSA  He underwent transrectal ultrasound-guided prostate biopsy in the office after most recent PSA 5 27 which johanne significantly over the past few years  He had no postprocedure complications  He returns today to discuss results  Pathology revealed [place] 6 prostate cancer in 10% of 1 of 12 cores  Otherwise all cores were benign  Review of Systems  Review of Systems   Constitutional: Negative  HENT: Negative  Respiratory: Negative  Cardiovascular: Negative  Gastrointestinal: Negative  Genitourinary:        As per HPI   Musculoskeletal: Negative  Skin: Negative  Neurological: Negative  Hematological: Negative            Past Medical History  Past Medical History:   Diagnosis Date    BPH (benign prostatic hyperplasia)     Hypertension        Past Social History  Past Surgical History:   Procedure Laterality Date    APPENDECTOMY      REVISION TOTAL HIP ARTHROPLASTY      TOTAL HIP ARTHROPLASTY         Past Family History  Family History   Problem Relation Age of Onset    No Known Problems Father     Stroke Mother        Past Social history  Social History     Socioeconomic History    Marital status: /Civil Union     Spouse name: Not on file    Number of children: Not on file    Years of education: Not on file    Highest education level: Not on file   Occupational History    Not on file   Social Needs    Financial resource strain: Not on file    Food insecurity     Worry: Not on file     Inability: Not on file   [employer] needs     Medical: Not on file     Non-medical: Not on file   Tobacco Use    Smoking status: Former Smoker    Smokeless tobacco: Never Used    Tobacco comment: pipe - quit inthe 70"s   Substance and Sexual Activity    Alcohol use:  Yes     Alcohol/week: 1 0 standard drinks     Types: 1 Glasses of wine per week    Drug use: No    Sexual activity: Not on file   Lifestyle    Physical activity     Days per week: Not on file     Minutes per session: Not on file    Stress: Not on file   Relationships    Social connections     Talks on phone: Not on file     Gets together: Not on file     Attends Confucianist service: Not on file     Active member of club or organization: Not on file     Attends meetings of clubs or organizations: Not on file     Relationship status: Not on file    Intimate partner violence     Fear of current or ex partner: Not on file     Emotionally abused: Not on file     Physically abused: Not on file     Forced sexual activity: Not on file   Other Topics Concern    Not on file   Social History Narrative    Not on file     Social History     Tobacco Use   Smoking Status Former Smoker   Smokeless Tobacco Never Used   Tobacco Comment    pipe - quit inthe 70"s       Current Medications  Current Outpatient Medications   Medication Sig Dispense Refill    aspirin (ADULT ASPIRIN EC LOW STRENGTH) 81 mg EC tablet Take by mouth      candesartan (ATACAND) 16 mg tablet Take 8 mg by mouth daily       multivitamin (THERAGRAN) TABS Take 1 tablet by mouth      sildenafil (VIAGRA) 100 mg tablet Viagra 100 mg tablet       No current facility-administered medications for this visit  Allergies  Allergies   Allergen Reactions    Celecoxib Dizziness       Past Medical History, Social History, Family History, medications and allergies were reviewed  Vitals  Vitals:    04/21/21 1128   BP: 130/72   BP Location: Left arm   Patient Position: Sitting   Cuff Size: Adult   Pulse: 78   Weight: 88 5 kg (195 lb)   Height: 5' 10" (1 778 m)       Physical Exam  Physical Exam  Vitals signs reviewed  Constitutional:       Appearance: He is well-developed  HENT:      Head: Normocephalic and atraumatic  Eyes:      Conjunctiva/sclera: Conjunctivae normal    Cardiovascular:      Rate and Rhythm: Normal rate  Pulmonary:      Effort: Pulmonary effort is normal    Musculoskeletal: Normal range of motion  Skin:     General: Skin is warm and dry  Neurological:      Mental Status: He is alert and oriented to person, place, and time     Psychiatric:         Mood and Affect: Mood normal            Results  No results found for: PSA  No results found for: GLUCOSE, CALCIUM, NA, K, CO2, CL, BUN, CREATININE  No results found for: WBC, HGB, HCT, MCV, PLT

## 2021-08-12 ENCOUNTER — OFFICE VISIT (OUTPATIENT)
Dept: UROLOGY | Facility: AMBULATORY SURGERY CENTER | Age: 76
End: 2021-08-12
Payer: MEDICARE

## 2021-08-12 VITALS
HEART RATE: 80 BPM | DIASTOLIC BLOOD PRESSURE: 80 MMHG | BODY MASS INDEX: 26.48 KG/M2 | WEIGHT: 185 LBS | SYSTOLIC BLOOD PRESSURE: 110 MMHG | HEIGHT: 70 IN

## 2021-08-12 DIAGNOSIS — N28.1 RENAL CYST: ICD-10-CM

## 2021-08-12 DIAGNOSIS — C61 PROSTATE CANCER (HCC): ICD-10-CM

## 2021-08-12 DIAGNOSIS — N52.9 ERECTILE DYSFUNCTION, UNSPECIFIED ERECTILE DYSFUNCTION TYPE: Primary | ICD-10-CM

## 2021-08-12 PROCEDURE — 99213 OFFICE O/P EST LOW 20 MIN: CPT | Performed by: NURSE PRACTITIONER

## 2021-08-12 NOTE — PROGRESS NOTES
8/12/2021    Jackie Bueno  1945  [id number]      Assessment  -Sahra 6 prostate cancer on active surveillance    Discussion/Plan  Shabana Callahan is a 66 y o  male being managed by Dr Balaji Gamboa      1  Sahra 6 prostate cancer on active surveillance- We reviewed the results of his recent PSA which is 5 49, previously 5 27  His PSA remains stable at this time  I do recommend close observation of his PSA trend  We discussed scheduling an earlier prostate biopsy or multiparmetric MRI of prostate should his PSA continue to rise  Otherwise will arrange in April 2022  He is otherwise doing well without any urinary complaints  Follow up in 3-4 months with PSA  He was instructed to call sooner with any issues    -All questions answered, patient agrees with plan      History of Present Illness  66 y o  male with a history of [place] 6 prostate cancer and ED presents today for follow up  Patient diagnosed with [place] 6 prostate cancer involving one core in April 2020  His PSA had been 5 27  Patient remains on active surveillance  He denies any strong family history of prostate cancer  Patient denies any lower urinary tract symptoms, gross hematuria, or dysuria  He continues to use sildenafil as needed prior to sexual activity  Review of Systems  Review of Systems   Constitutional: Negative  HENT: Negative  Respiratory: Negative  Cardiovascular: Negative  Gastrointestinal: Negative  Genitourinary: Negative for decreased urine volume, difficulty urinating, dysuria, flank pain, frequency, hematuria and urgency  Musculoskeletal: Negative  Skin: Negative  Neurological: Negative  Psychiatric/Behavioral: Negative            Past Medical History  Past Medical History:   Diagnosis Date    BPH (benign prostatic hyperplasia)     Hypertension        Past Social History  Past Surgical History:   Procedure Laterality Date    APPENDECTOMY      REVISION TOTAL HIP ARTHROPLASTY      TOTAL HIP ARTHROPLASTY         Past Family History  Family History   Problem Relation Age of Onset    No Known Problems Father     Stroke Mother        Past Social history  Social History     Socioeconomic History    Marital status: /Civil Union     Spouse name: Not on file    Number of children: Not on file    Years of education: Not on file    Highest education level: Not on file   Occupational History    Not on file   Tobacco Use    Smoking status: Former Smoker    Smokeless tobacco: Never Used    Tobacco comment: pipe - quit inthe 70"s   Vaping Use    Vaping Use: Never used   Substance and Sexual Activity    Alcohol use: Yes     Alcohol/week: 1 0 standard drinks     Types: 1 Glasses of wine per week    Drug use: No    Sexual activity: Not on file   Other Topics Concern    Not on file   Social History Narrative    Not on file     Social Determinants of Health     Financial Resource Strain:     Difficulty of Paying Living Expenses:    Food Insecurity:     Worried About Running Out of Food in the Last Year:     [address] in the Last Year:    Transportation Needs:     Lack of Transportation (Medical):      Lack of Transportation (Non-Medical):    Physical Activity:     Days of Exercise per Week:     Minutes of Exercise per Session:    Stress:     Feeling of Stress :    Social Connections:     Frequency of Communication with Friends and Family:     Frequency of Social Gatherings with Friends and Family:     Attends Buddhist Services:     Active Member of Clubs or Organizations:     Attends Club or Organization Meetings:     Marital Status:    Intimate Partner Violence:     Fear of Current or Ex-Partner:     Emotionally Abused:     Physically Abused:     Sexually Abused:        Current Medications  Current Outpatient Medications   Medication Sig Dispense Refill    aspirin (ADULT ASPIRIN EC LOW STRENGTH) 81 mg EC tablet Take by mouth      candesartan (ATACAND) 16 mg tablet Take 8 mg by mouth daily       multivitamin (THERAGRAN) TABS Take 1 tablet by mouth      sildenafil (VIAGRA) 100 mg tablet Viagra 100 mg tablet       No current facility-administered medications for this visit  Allergies  Allergies   Allergen Reactions    Celecoxib Dizziness       Past Medical History, Social History, Family History, medications and allergies were reviewed  Vitals  Vitals:    08/12/21 1404   BP: 110/80   Pulse: 80   Weight: 83 9 kg (185 lb)   Height: 5' 10" (1 778 m)       Physical Exam  Physical Exam  Constitutional:       Appearance: Normal appearance  He is well-developed  HENT:      Head: Normocephalic  Eyes:      Pupils: Pupils are equal, round, and reactive to light  Pulmonary:      Effort: Pulmonary effort is normal    Abdominal:      Palpations: Abdomen is soft  Musculoskeletal:         General: Normal range of motion  Cervical back: Normal range of motion  Skin:     General: Skin is warm and dry  Neurological:      General: No focal deficit present  Mental Status: He is alert and oriented to person, place, and time  Psychiatric:         Mood and Affect: Mood normal          Behavior: Behavior normal          Thought Content: Thought content normal          Judgment: Judgment normal          Results    I have personally reviewed all pertinent lab results and reviewed with patient  No results found for: PSA  No results found for: GLUCOSE, CALCIUM, NA, K, CO2, CL, BUN, CREATININE  No results found for: WBC, HGB, HCT, MCV, PLT  No results found for this or any previous visit (from the past 1 hour(s))

## 2021-12-13 ENCOUNTER — OFFICE VISIT (OUTPATIENT)
Dept: UROLOGY | Facility: AMBULATORY SURGERY CENTER | Age: 76
End: 2021-12-13
Payer: MEDICARE

## 2021-12-13 ENCOUNTER — TELEPHONE (OUTPATIENT)
Dept: UROLOGY | Facility: AMBULATORY SURGERY CENTER | Age: 76
End: 2021-12-13

## 2021-12-13 VITALS
HEART RATE: 60 BPM | SYSTOLIC BLOOD PRESSURE: 136 MMHG | BODY MASS INDEX: 26.63 KG/M2 | HEIGHT: 70 IN | WEIGHT: 186 LBS | DIASTOLIC BLOOD PRESSURE: 84 MMHG

## 2021-12-13 DIAGNOSIS — N52.9 ERECTILE DYSFUNCTION, UNSPECIFIED ERECTILE DYSFUNCTION TYPE: ICD-10-CM

## 2021-12-13 DIAGNOSIS — C61 PROSTATE CANCER (HCC): Primary | ICD-10-CM

## 2021-12-13 PROCEDURE — 99213 OFFICE O/P EST LOW 20 MIN: CPT | Performed by: NURSE PRACTITIONER

## 2022-04-11 ENCOUNTER — TELEPHONE (OUTPATIENT)
Dept: OTHER | Facility: OTHER | Age: 77
End: 2022-04-11

## 2022-04-11 NOTE — TELEPHONE ENCOUNTER
Patient is calling to ask if there are any instructions for his procedure coming up on 4/19  Please call him back to advise

## 2022-04-19 ENCOUNTER — PROCEDURE VISIT (OUTPATIENT)
Dept: UROLOGY | Facility: AMBULATORY SURGERY CENTER | Age: 77
End: 2022-04-19
Payer: MEDICARE

## 2022-04-19 VITALS
WEIGHT: 186 LBS | SYSTOLIC BLOOD PRESSURE: 140 MMHG | BODY MASS INDEX: 26.63 KG/M2 | HEIGHT: 70 IN | HEART RATE: 62 BPM | DIASTOLIC BLOOD PRESSURE: 70 MMHG

## 2022-04-19 DIAGNOSIS — C61 PROSTATE CANCER (HCC): Primary | ICD-10-CM

## 2022-04-19 PROCEDURE — G0416 PROSTATE BIOPSY, ANY MTHD: HCPCS | Performed by: PATHOLOGY

## 2022-04-19 PROCEDURE — 96372 THER/PROPH/DIAG INJ SC/IM: CPT

## 2022-04-19 PROCEDURE — 76942 ECHO GUIDE FOR BIOPSY: CPT | Performed by: UROLOGY

## 2022-04-19 PROCEDURE — 88344 IMHCHEM/IMCYTCHM EA MLT ANTB: CPT | Performed by: PATHOLOGY

## 2022-04-19 PROCEDURE — 55700 PR BIOPSY OF PROSTATE,NEEDLE/PUNCH: CPT | Performed by: UROLOGY

## 2022-04-19 RX ORDER — CEFTRIAXONE 1 G/1
1000 INJECTION, POWDER, FOR SOLUTION INTRAMUSCULAR; INTRAVENOUS ONCE
Status: COMPLETED | OUTPATIENT
Start: 2022-04-19 | End: 2022-04-19

## 2022-04-19 RX ORDER — ATORVASTATIN CALCIUM 20 MG/1
TABLET, FILM COATED ORAL
COMMUNITY
Start: 2022-02-08

## 2022-04-19 RX ADMIN — CEFTRIAXONE 1000 MG: 1 INJECTION, POWDER, FOR SOLUTION INTRAMUSCULAR; INTRAVENOUS at 13:15

## 2022-04-19 NOTE — PROGRESS NOTES
Prostate Biopsy note: The patient returns to the office today to undergo a transrectal ultrasound-guided biopsy of the prostate secondary to Sahra 6 prostate cancer  Risk and benefits of the procedure were discussed  Informed consent was obtained  The patient's prebiopsy preparation was deemed to be adequate  Antibiotics had been taken as prescribed  If appropriate blood thinners had been placed on hold  The patient completed an enema as prescribed  The patient was placed in the lateral decubitus position  Digital rectal examination was performed revealing a 35 gram prostate without nodules  Viscous lidocaine jelly was instilled into the rectum  Transrectal ultrasonography was then performed  The prostate measured 31 grams  No obvious mass visible on ultrasound  There is central zone calcification  5 cc of 2% lidocaine were then injected bilaterally between the junction of the base of the prostate and the seminal vesicles  Ultrasound guidance was utilized to place the needle into proper position for the administration of the local anesthetic  A standard 12 core biopsy was then performed  Overall the patient tolerated the procedure and there were no complications

## 2022-05-03 ENCOUNTER — OFFICE VISIT (OUTPATIENT)
Dept: UROLOGY | Facility: AMBULATORY SURGERY CENTER | Age: 77
End: 2022-05-03
Payer: MEDICARE

## 2022-05-03 VITALS
SYSTOLIC BLOOD PRESSURE: 132 MMHG | HEART RATE: 82 BPM | WEIGHT: 187 LBS | HEIGHT: 70 IN | BODY MASS INDEX: 26.77 KG/M2 | DIASTOLIC BLOOD PRESSURE: 82 MMHG | OXYGEN SATURATION: 97 %

## 2022-05-03 DIAGNOSIS — C61 PROSTATE CANCER (HCC): Primary | ICD-10-CM

## 2022-05-03 PROCEDURE — 99213 OFFICE O/P EST LOW 20 MIN: CPT | Performed by: UROLOGY

## 2022-05-03 RX ORDER — CANDESARTAN 8 MG/1
TABLET ORAL
COMMUNITY

## 2022-05-03 NOTE — PROGRESS NOTES
5/3/2022    Christopher Barnett  1945  [id number]        Assessment  Water[place] 6 prostate cancer active surveillance    Plan  We discussed the findings  There is no evidence of disease progression  Patient is reassured  Recommendation is for continued surveillance  Recheck PSA every 6 months at this point  Follow-up biopsies determined by any significant PSA velocity  Patient is comfortable this plan  Total visit time was 15 minutes of which over 50% was spent on counseling  History of Present Illness  Junior Neri is a 64 y o  male with history of [place] 6 prostate cancer on active surveillance  PSA around 5 range  He underwent confirmatory biopsy returns today to discuss results  Initial biopsy revealed only 1/12 cores with [place] 6 disease at the left base  Current results does not reveal any evidence of malignancy  There is an area of atypical glands at the left base consistent with prior results  AUA SYMPTOM SCORE      Most Recent Value   AUA SYMPTOM SCORE    How often have you had a sensation of not emptying your bladder completely after you finished urinating? 1 (P)     How often have you had to urinate again less than two hours after you finished urinating? 2 (P)     How often have you found you stopped and started again several times when you urinate? 1 (P)     How often have you found it difficult to postpone urination? 1 (P)     How often have you had a weak urinary stream? 1 (P)     How often have you had to push or strain to begin urination? 0 (P)     How many times did you most typically get up to urinate from the time you went to bed at night until the time you got up in the morning? 2 (P)     Quality of Life: If you were to spend the rest of your life with your urinary condition just the way it is now, how would you feel about that? 2 (P)     AUA SYMPTOM SCORE 8 (P)           Review of Systems  Review of Systems   Constitutional: Negative  HENT: Negative  Respiratory: Negative  Cardiovascular: Negative  Gastrointestinal: Negative  Genitourinary:        As per HPI   Musculoskeletal: Negative  Skin: Negative  Neurological: Negative  Hematological: Negative  Past Medical History  Past Medical History:   Diagnosis Date    BPH (benign prostatic hyperplasia)     Hypertension        Past Social History  Past Surgical History:   Procedure Laterality Date    APPENDECTOMY      REVISION TOTAL HIP ARTHROPLASTY      TOTAL HIP ARTHROPLASTY         Past Family History  Family History   Problem Relation Age of Onset    No Known Problems Father     Stroke Mother        Past Social history  Social History     Socioeconomic History    Marital status: /Civil Union     Spouse name: Not on file    Number of children: Not on file    Years of education: Not on file    Highest education level: Not on file   Occupational History    Not on file   Tobacco Use    Smoking status: Former Smoker    Smokeless tobacco: Never Used    Tobacco comment: pipe - quit inthe 70"s   Vaping Use    Vaping Use: Never used   Substance and Sexual Activity    Alcohol use:  Yes     Alcohol/week: 1 0 standard drink     Types: 1 Glasses of wine per week    Drug use: No    Sexual activity: Not on file   Other Topics Concern    Not on file   Social History Narrative    Not on file     Social Determinants of Health     Financial Resource Strain: Not on file   Food Insecurity: Not on file   Transportation Needs: Not on file   Physical Activity: Not on file   Stress: Not on file   Social Connections: Not on file   Intimate Partner Violence: Not on file   Housing Stability: Not on file     Social History     Tobacco Use   Smoking Status Former Smoker   Smokeless Tobacco Never Used   Tobacco Comment    pipe - quit inthe 70"s       Current Medications  Current Outpatient Medications   Medication Sig Dispense Refill    aspirin (ADULT ASPIRIN EC LOW STRENGTH) 81 mg EC tablet Take by mouth      atorvastatin (LIPITOR) 20 mg tablet       candesartan (ATACAND) 8 MG tablet       multivitamin (THERAGRAN) TABS Take 1 tablet by mouth      sildenafil (VIAGRA) 100 mg tablet Viagra 100 mg tablet       No current facility-administered medications for this visit  Allergies  No Known Allergies    Past Medical History, Social History, Family History, medications and allergies were reviewed      Vitals  Vitals:    05/03/22 1104   BP: 132/82   BP Location: Left arm   Patient Position: Sitting   Cuff Size: Adult   Pulse: 82   SpO2: 97%   Weight: 84 8 kg (187 lb)   Height: 5' 10" (1 778 m)       Physical Exam  Physical Exam      Results  No results found for: PSA  No results found for: GLUCOSE, CALCIUM, NA, K, CO2, CL, BUN, CREATININE  No results found for: WBC, HGB, HCT, MCV, PLT

## 2023-01-19 NOTE — TELEPHONE ENCOUNTER
Problem: At Risk for Falls  Goal: # Patient does not fall  Outcome: Outcome Not Met, Continue to Monitor  Goal: # Takes action to control fall-related risks  Outcome: Outcome Not Met, Continue to Monitor  Goal: # Verbalizes understanding of fall risk/precautions  Description: Document education using the patient education activity  Outcome: Outcome Not Met, Continue to Monitor     Problem: At Risk for Injury Due to Fall  Goal: # Patient does not fall  Outcome: Outcome Not Met, Continue to Monitor  Goal: # Takes action to control condition specific risks  Outcome: Outcome Not Met, Continue to Monitor  Goal: # Verbalizes understanding of fall-related injury personal risks  Description: Document education using the patient education activity  Outcome: Outcome Not Met, Continue to Monitor     Problem: Diabetes  Goal: Glycemic balance achieved/maintained  Description: Goal is to maintain blood sugar within range with no episodes of hypoglycemia  Outcome: Outcome Not Met, Continue to Monitor  Goal: Verbalizes/demonstrates understanding of Diabetes  Description: Document on Patient Education Activity  Outcome: Outcome Not Met, Continue to Monitor     Problem: Activity Intolerance  Goal: # Functional status is maintained or returned to baseline  Outcome: Outcome Not Met, Continue to Monitor  Goal: # Tolerates activity for d/c setting with no clinical problems  Outcome: Outcome Not Met, Continue to Monitor     Problem: Pressure Injury, Risk for  Goal: # Skin remains intact  Outcome: Outcome Not Met, Continue to Monitor  Goal: No new pressure injury (PI) development  Outcome: Outcome Not Met, Continue to Monitor  Goal: # Verbalizes understanding of PI risk factors and prevention strategies  Description: Document education using the patient education activity.   Outcome: Outcome Not Met, Continue to Monitor     Problem: Pressure Injury Actual  Goal: # No deterioration in pressure injury (PI)  Outcome: Outcome Not Met,  Patient was seen by Dr Jonathan Trent today and needs to be scheduled for trus bx (and discussion)  No office surgery slots found for the next several months  Please assist in finding appointment  Thank you! Continue to Monitor  Goal: # Verbalizes pressure injury management  Description: Document education using the patient education activity.  Outcome: Outcome Not Met, Continue to Monitor     Problem: Non-Pressure Injury Wound  Goal: # No deterioration in wound  Outcome: Outcome Not Met, Continue to Monitor  Goal: # Verbalizes understanding of wound and wound care  Description: If abnormality is a skin tear, avoid using tape on skin including transparent dressings. Document education using the patient education activity.  Outcome: Outcome Not Met, Continue to Monitor  Goal: Participates in wound care activities  Outcome: Outcome Not Met, Continue to Monitor     Problem: Cellulitis  Goal: Cellulitis improved as evidenced by decreased redness, swelling and pain  Description: Girth measurement may be used to evaluate edema.  Outcome: Outcome Not Met, Continue to Monitor     Problem: Pain  Goal: #Acceptable pain level achieved/maintained at rest using NRS/Faces  Description: This goal is used for patients who can self-report.  Acceptable means the level is at or below the identified comfort/function goal.  Outcome: Outcome Not Met, Continue to Monitor  Goal: # Acceptable pain level achieved/maintained with activity using NRS/Faces  Description: This goal is used for patients who can self-report and are not achieving acceptable pain control during activity.  Outcome: Outcome Not Met, Continue to Monitor  Goal: # Verbalizes understanding of pain management  Description: Documented in Patient Education Activity  Outcome: Outcome Not Met, Continue to Monitor

## 2023-05-02 ENCOUNTER — OFFICE VISIT (OUTPATIENT)
Dept: UROLOGY | Facility: AMBULATORY SURGERY CENTER | Age: 78
End: 2023-05-02

## 2023-05-02 VITALS
WEIGHT: 184 LBS | SYSTOLIC BLOOD PRESSURE: 124 MMHG | HEIGHT: 70 IN | HEART RATE: 81 BPM | OXYGEN SATURATION: 99 % | BODY MASS INDEX: 26.34 KG/M2 | DIASTOLIC BLOOD PRESSURE: 84 MMHG

## 2023-05-02 DIAGNOSIS — C61 PROSTATE CANCER (HCC): Primary | ICD-10-CM

## 2023-05-02 RX ORDER — LOSARTAN POTASSIUM 25 MG/1
TABLET ORAL
COMMUNITY
Start: 2023-04-23

## 2023-05-02 NOTE — PROGRESS NOTES
5/2/2023    Katelin Pardo  1945  [id number]        Assessment  Sahra 6 prostate cancer active surveillance    Plan  We discussed his clinically insignificant prostate cancer  We will continue with surveillance  He is comfortable with PSA and exam once annually  I think this is reasonable  He will notify us with any problems with his urinary stream or emptying, otherwise continue annual follow-up at this point  History of Present Illness  Lokesh Jackson is a 76 y o  male with history of Ash[place] 6 prostate cancer on active surveillance  PSA around 5 range  Initial biopsy revealed only 1/12 cores with [place] 6 disease at the left base      Confirmatory biopsy does not reveal any evidence of malignancy  There is an area of atypical glands at the left base consistent with prior results  He has no complaints  AUA SYMPTOM SCORE    Flowsheet Row Most Recent Value   AUA SYMPTOM SCORE    How often have you had a sensation of not emptying your bladder completely after you finished urinating? 1 (P)     How often have you had to urinate again less than two hours after you finished urinating? 1 (P)     How often have you found you stopped and started again several times when you urinate? 2 (P)     How often have you found it difficult to postpone urination? 1 (P)     How often have you had a weak urinary stream? 2 (P)     How often have you had to push or strain to begin urination? 0 (P)     How many times did you most typically get up to urinate from the time you went to bed at night until the time you got up in the morning? 3 (P)     Quality of Life: If you were to spend the rest of your life with your urinary condition just the way it is now, how would you feel about that? 2 (P)     AUA SYMPTOM SCORE 10 (P)           Review of Systems  Review of Systems   Constitutional: Negative  HENT: Negative  Respiratory: Negative  Cardiovascular: Negative  Gastrointestinal: Negative  "  Genitourinary:        As per HPI   Musculoskeletal: Negative  Skin: Negative  Neurological: Negative  Hematological: Negative  Past Medical History  Past Medical History:   Diagnosis Date    BPH (benign prostatic hyperplasia)     Hypertension        Past Social History  Past Surgical History:   Procedure Laterality Date    APPENDECTOMY      REVISION TOTAL HIP ARTHROPLASTY      TOTAL HIP ARTHROPLASTY         Past Family History  Family History   Problem Relation Age of Onset    No Known Problems Father     Stroke Mother        Past Social history  Social History     Socioeconomic History    Marital status: /Civil Union     Spouse name: Not on file    Number of children: Not on file    Years of education: Not on file    Highest education level: Not on file   Occupational History    Not on file   Tobacco Use    Smoking status: Former    Smokeless tobacco: Never    Tobacco comments:     pipe - quit inthe 70\"s   Vaping Use    Vaping Use: Never used   Substance and Sexual Activity    Alcohol use:  Yes     Alcohol/week: 1 0 standard drink     Types: 1 Glasses of wine per week    Drug use: No    Sexual activity: Not on file   Other Topics Concern    Not on file   Social History Narrative    Not on file     Social Determinants of Health     Financial Resource Strain: Not on file   Food Insecurity: Not on file   Transportation Needs: Not on file   Physical Activity: Not on file   Stress: Not on file   Social Connections: Not on file   Intimate Partner Violence: Not on file   Housing Stability: Not on file     Social History     Tobacco Use   Smoking Status Former   Smokeless Tobacco Never   Tobacco Comments    pipe - quit inthe 70\"s       Current Medications  Current Outpatient Medications   Medication Sig Dispense Refill    atorvastatin (LIPITOR) 20 mg tablet       losartan (COZAAR) 25 mg tablet       metoprolol tartrate (LOPRESSOR) 25 mg tablet       multivitamin " "(THERAGRAN) TABS Take 1 tablet by mouth      aspirin (ECOTRIN LOW STRENGTH) 81 mg EC tablet Take by mouth (Patient not taking: Reported on 5/2/2023)       No current facility-administered medications for this visit  Allergies  No Known Allergies    Past Medical History, Social History, Family History, medications and allergies were reviewed  Vitals  Vitals:    05/02/23 0954   BP: 124/84   BP Location: Left arm   Patient Position: Sitting   Cuff Size: Adult   Pulse: 81   SpO2: 99%   Weight: 83 5 kg (184 lb)   Height: 5' 10\" (1 778 m)       Physical Exam  Physical Exam  Vitals reviewed  Constitutional:       Appearance: He is well-developed  HENT:      Head: Normocephalic and atraumatic  Eyes:      Conjunctiva/sclera: Conjunctivae normal    Cardiovascular:      Rate and Rhythm: Normal rate  Pulmonary:      Effort: Pulmonary effort is normal    Abdominal:      Palpations: Abdomen is soft  Genitourinary:     Comments: Patient declined exam due to hemorrhoid issue  Skin:     General: Skin is warm and dry  Neurological:      Mental Status: He is alert and oriented to person, place, and time     Psychiatric:         Mood and Affect: Mood normal            Results  No results found for: PSA  No results found for: GLUCOSE, CALCIUM, NA, K, CO2, CL, BUN, CREATININE  No results found for: WBC, HGB, HCT, MCV, PLT        "

## 2024-04-23 LAB — PSA SERPL-MCNC: 4.05 NG/ML

## 2024-04-24 ENCOUNTER — TELEPHONE (OUTPATIENT)
Age: 79
End: 2024-04-24

## 2024-04-24 NOTE — TELEPHONE ENCOUNTER
Message sent via [employer] today:        Comments:  Hello,  I'm a patient of Dr Bradshaw and am trying to schedule my annual checkup, which is due in May. (I have prostate cancer that is under observation). However, going online I see that the first available time is in November. Can I schedule something sooner? My latest PSA result shows a value of 4.05.    Thanks.  Dave Taylor    [phone]      Please review and call patient regarding this.    CB: [phone]

## 2024-04-24 NOTE — TELEPHONE ENCOUNTER
Patient has been scheduled as follows:    Date: 6/5/2024     Arrival Time: 12:45 PM     Visit Type: FOLLOW UP PG [[id number]]     Provider: Jensen Auguste PA-C Department: [facility]

## 2024-06-05 ENCOUNTER — OFFICE VISIT (OUTPATIENT)
Dept: UROLOGY | Facility: AMBULATORY SURGERY CENTER | Age: 79
End: 2024-06-05
Payer: MEDICARE

## 2024-06-05 VITALS
OXYGEN SATURATION: 97 % | HEART RATE: 44 BPM | HEIGHT: 70 IN | WEIGHT: 189 LBS | DIASTOLIC BLOOD PRESSURE: 78 MMHG | BODY MASS INDEX: 27.06 KG/M2 | SYSTOLIC BLOOD PRESSURE: 132 MMHG

## 2024-06-05 DIAGNOSIS — C61 PROSTATE CANCER (HCC): Primary | ICD-10-CM

## 2024-06-05 PROCEDURE — 99213 OFFICE O/P EST LOW 20 MIN: CPT

## 2024-06-05 RX ORDER — LOSARTAN POTASSIUM 50 MG/1
TABLET ORAL
COMMUNITY

## 2024-06-05 NOTE — ASSESSMENT & PLAN NOTE
Patient has a history of Sahra 3+3 equal 6 prostate cancer on active surveillance  The patient underwent transrectal ultrasound-guided prostate biopsy in the office on 04/05/2021 and that time was found to have 1 core positive for Sahra 3+3 equal 6 prostate cancer.  While on active surveillance the patient had a history of elevated PSA as high as 5.25 in November 2022.  Patient underwent repeat transrectal ultrasound-guided prostate biopsy in the office on 4/19/2022 and the confirmatory biopsy did not reveal any evidence of malignancy.  However, it did reveal an area of atypical glands at the left base consistent with prior results.  Patient has been on active surveillance and his PSA has remained stable since the one episode of elevated PSA of 5.25 in November 2022  We discussed today that the patient's most recent PSA was found to be 4.05 on 4/23/2024.  At this time, we discussed that we can continue with active surveillance and continue to monitor his PSA yearly with.  The patient's next PSA would be due in April 2025.  The patient reported today that he is moving to [place] around November/December to move closer to his kids.  Patient stated that he will establish himself with a new urologist out there.  I advised the patient to still have his PSA monitored in April 2025 and to have what ever office he establishes himself at to request our medical records on the patient's prior urological history.

## 2024-06-05 NOTE — PROGRESS NOTES
6/5/2024      Assessment and Plan    78 y.o. male managed by Dr. Bradshaw    Prostate cancer (HCC)  Patient has a history of [place] 3+3 equal 6 prostate cancer on active surveillance  The patient underwent transrectal ultrasound-guided prostate biopsy in the office on 04/05/2021 and that time was found to have 1 core positive for [place] 3+3 equal 6 prostate cancer.  While on active surveillance the patient had a history of elevated PSA as high as 5.25 in November 2022.  Patient underwent repeat transrectal ultrasound-guided prostate biopsy in the office on 4/19/2022 and the confirmatory biopsy did not reveal any evidence of malignancy.  However, it did reveal an area of atypical glands at the left base consistent with prior results.  Patient has been on active surveillance and his PSA has remained stable since the one episode of elevated PSA of 5.25 in November 2022  We discussed today that the patient's most recent PSA was found to be 4.05 on 4/23/2024.  At this time, we discussed that we can continue with active surveillance and continue to monitor his PSA yearly with.  The patient's next PSA would be due in April 2025.  The patient reported today that he is moving to [place] around November/December to move closer to his kids.  Patient stated that he will establish himself with a new urologist out there.  I advised the patient to still have his PSA monitored in April 2025 and to have what ever office he establishes himself at to request our medical records on the patient's prior urological history.        History of Present Illness  Dave Taylor is a 78 y.o. male here for evaluation of history of [place] 3+3 equal 6 prostate cancer on active surveillance.  The patient underwent transrectal ultrasound-guided prostate biopsy in the office on 04/05/2021 and at that time was found to have 1 core positive for [place] 3+3 equal 6 prostate cancer.  Patient has a history of elevated PSA as high as 5.25 in  November 2022.  Patient then underwent repeat prostate biopsy on 4/19/2022 and the confirmatory biopsy did not reveal any evidence of malignancy.  However, it did reveal an area of atypical glands at the left base consistent with prior results.  After being diagnosed with [place] 6 prostate cancer, the patient elected for active surveillance and his PSAs have remained stable since the one occurrence of the elevated PSA of 5.25.  Most recently, the patient had his PSA retested on 4/23/2024 and his PSA was found to be 4.05 at that time.  At this time, the patient offers no new lower urinary tract complaints and is content with his urinary frequency/urgency.  We will continue with active surveillance for his history of [place] 3+3 equal 6 prostate cancer.  The patient notes that he will be moving to [place] to be closer to his kids and will have to find a new urologist in [place].  We discussed that his next PSA would be due in April 2025 and that we can place the order for the blood test at this time, but that he should establish with a urologist out there to continue active surveillance and notify our office to have our notes on the patient sent to what ever urology practice he is established at.        Review of Systems   Constitutional:  Negative for chills and fever.   HENT:  Negative for ear pain and sore throat.    Eyes:  Negative for pain and visual disturbance.   Respiratory:  Negative for cough and shortness of breath.    Cardiovascular:  Negative for chest pain and palpitations.   Gastrointestinal:  Negative for abdominal pain and vomiting.   Genitourinary:  Negative for difficulty urinating, dysuria, frequency, hematuria and urgency.   Musculoskeletal:  Negative for arthralgias and back pain.   Skin:  Negative for color change and rash.   Neurological:  Negative for seizures and syncope.   All other systems reviewed and are negative.          AUA SYMPTOM SCORE      Flowsheet Row Most Recent Value  "  AUA SYMPTOM SCORE    How often have you had a sensation of not emptying your bladder completely after you finished urinating? 1 (P)     How often have you had to urinate again less than two hours after you finished urinating? 2 (P)     How often have you found you stopped and started again several times when you urinate? 1 (P)     How often have you found it difficult to postpone urination? 1 (P)     How often have you had a weak urinary stream? 1 (P)     How often have you had to push or strain to begin urination? 0 (P)     How many times did you most typically get up to urinate from the time you went to bed at night until the time you got up in the morning? 4 (P)     Quality of Life: If you were to spend the rest of your life with your urinary condition just the way it is now, how would you feel about that? 2 (P)     AUA SYMPTOM SCORE 10 (P)               Vitals  Vitals:    06/05/24 1306   BP: 132/78   BP Location: Left arm   Patient Position: Sitting   Cuff Size: Adult   Pulse: (!) 44   SpO2: 97%   Weight: 85.7 kg (189 lb)   Height: 5' 10\" (1.778 m)       Physical Exam  Vitals reviewed.   Constitutional:       General: He is not in acute distress.     Appearance: Normal appearance. He is not ill-appearing.   HENT:      Head: Normocephalic and atraumatic.      Nose: Nose normal.   Eyes:      General: No scleral icterus.  Pulmonary:      Effort: No respiratory distress.   Abdominal:      General: Abdomen is flat. There is no distension.      Palpations: Abdomen is soft.      Tenderness: There is no abdominal tenderness.   Skin:     General: Skin is warm.      Coloration: Skin is not jaundiced.   Neurological:      Mental Status: He is alert and oriented to person, place, and time.      Gait: Gait normal.   Psychiatric:         Mood and Affect: Mood normal.         Behavior: Behavior normal.           Past History  Past Medical History:   Diagnosis Date    BPH (benign prostatic hyperplasia)     Hypertension  " "    Social History     Socioeconomic History    Marital status: /Civil Union     Spouse name: None    Number of children: None    Years of education: None    Highest education level: None   Occupational History    None   Tobacco Use    Smoking status: Former     Types: Pipe    Smokeless tobacco: Never    Tobacco comments:     pipe - quit inthe 70\"s   Vaping Use    Vaping status: Never Used   Substance and Sexual Activity    Alcohol use: Yes     Alcohol/week: 2.0 standard drinks of alcohol     Types: 1 Glasses of wine, 1 Cans of beer per week     Comment: usually with dinner    Drug use: No    Sexual activity: None   Other Topics Concern    None   Social History Narrative    None     Social Determinants of Health     Financial Resource Strain: Low Risk  (5/3/2023)    Received from [facility]    Overall Financial Resource Strain (CARDIA)     Difficulty of Paying Living Expenses: Not hard at all   Food Insecurity: No Food Insecurity (5/3/2023)    Received from [facility]    Hunger Vital Sign     Worried About Running Out of Food in the Last Year: Never true     Ran Out of Food in the Last Year: Never true   Transportation Needs: No Transportation Needs (5/3/2023)    Received from [facility]    PRAPARE - Transportation     Lack of Transportation (Medical): No     Lack of Transportation (Non-Medical): No   Physical Activity: Not on file   Stress: No Stress Concern Present (5/3/2023)    Received from [facility]    [employer] [place] [employer] - Occupational Stress Questionnaire     Feeling of Stress : Not at all   Social Connections: Unknown (5/3/2023)    Received from [facility]    Social Connection and Isolation Panel [NHANES]     Frequency of Communication with Friends and Family: Twice a week     Frequency of Social Gatherings with Friends and Family: More than three times a week     Attends Hoahaoism Services: " "Patient declined     Active Member of Clubs or Organizations: No     Attends Club or Organization Meetings: Patient declined     Marital Status:    Intimate Partner Violence: Not At Risk (5/3/2023)    Received from [facility]    Humiliation, Afraid, Rape, and Kick questionnaire     Fear of Current or Ex-Partner: No     Emotionally Abused: No     Physically Abused: No     Sexually Abused: No   Housing Stability: Not on file     Social History     Tobacco Use   Smoking Status Former    Types: Pipe   Smokeless Tobacco Never   Tobacco Comments    pipe - quit inthe 70\"s     Family History   Problem Relation Age of Onset    No Known Problems Father     Stroke Mother        The following portions of the patient's history were reviewed and updated as appropriate: allergies, current medications, past medical history, past social history, past surgical history and problem list.    Results  No results found for this or any previous visit (from the past 1 hour(s)).]  Lab Results   Component Value Date    PSA 4.05 (H) 04/23/2024     Lab Results   Component Value Date    CALCIUM 9.0 04/23/2024    K 4.4 04/23/2024    CO2 27 04/23/2024     04/23/2024    BUN 16 04/23/2024    CREATININE 1.03 04/23/2024     Lab Results   Component Value Date    HGB 14.2 01/16/2018    HCT 41.8 01/16/2018      "